# Patient Record
Sex: FEMALE | Race: WHITE | NOT HISPANIC OR LATINO | URBAN - METROPOLITAN AREA
[De-identification: names, ages, dates, MRNs, and addresses within clinical notes are randomized per-mention and may not be internally consistent; named-entity substitution may affect disease eponyms.]

---

## 2020-06-02 ENCOUNTER — OFFICE VISIT (OUTPATIENT)
Dept: URBAN - METROPOLITAN AREA CLINIC 23 | Facility: CLINIC | Age: 85
End: 2020-06-02
Payer: MEDICARE

## 2020-06-02 DIAGNOSIS — H35.3111 NONEXUDATIVE AGE-RELATED MACULAR DEGENERATION OF RIGHT EYE, EARLY DRY STAGE: ICD-10-CM

## 2020-06-02 DIAGNOSIS — G51.0 BELL'S PALSY: Primary | ICD-10-CM

## 2020-06-02 DIAGNOSIS — H25.813 COMBINED FORMS OF AGE-RELATED CATARACT, BILATERAL: ICD-10-CM

## 2020-06-02 PROCEDURE — 92004 COMPRE OPH EXAM NEW PT 1/>: CPT | Performed by: OPTOMETRIST

## 2020-06-02 ASSESSMENT — KERATOMETRY
OS: 48.75
OD: 48.13

## 2020-06-02 ASSESSMENT — INTRAOCULAR PRESSURE
OS: 16
OD: 16

## 2020-06-02 NOTE — IMPRESSION/PLAN
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 Plan: Discussed findings. Recommend take children's multivitamin to see if she can tolerate it. Pt states she gets upset stomach with vitamins and green vegetables. Monitor.

## 2020-06-02 NOTE — IMPRESSION/PLAN
Impression: Bell's palsy: G51.0 OS. Plan: Discussed findings. No staining seen OS. Continue AT during the day and luc at bedtime.

## 2023-03-02 ENCOUNTER — OFFICE VISIT (OUTPATIENT)
Dept: URBAN - METROPOLITAN AREA CLINIC 23 | Facility: CLINIC | Age: 88
End: 2023-03-02
Payer: MEDICARE

## 2023-03-02 DIAGNOSIS — H35.3111 NONEXUDATIVE AGE-RELATED MACULAR DEGENERATION, RIGHT EYE, EARLY DRY STAGE: ICD-10-CM

## 2023-03-02 DIAGNOSIS — H25.813 COMBINED FORMS OF AGE-RELATED CATARACT, BILATERAL: Primary | ICD-10-CM

## 2023-03-02 DIAGNOSIS — H53.2 DIPLOPIA: ICD-10-CM

## 2023-03-02 PROCEDURE — 92134 CPTRZ OPH DX IMG PST SGM RTA: CPT | Performed by: OPTOMETRIST

## 2023-03-02 PROCEDURE — 99214 OFFICE O/P EST MOD 30 MIN: CPT | Performed by: OPTOMETRIST

## 2023-03-02 ASSESSMENT — INTRAOCULAR PRESSURE
OD: 14
OS: 14

## 2023-03-02 ASSESSMENT — KERATOMETRY
OD: 48.25
OS: 48.63

## 2023-03-02 ASSESSMENT — VISUAL ACUITY
OD: 20/50
OS: 20/60

## 2023-03-02 NOTE — IMPRESSION/PLAN
Impression: Nonexudative age-related macular degeneration, right eye, early dry stage: H35.3111. Right. Condition: established, stable. Plan: Stable drusen. Monitor, call with any new distortions.

## 2023-03-02 NOTE — IMPRESSION/PLAN
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 Condition: will continue to monitor. Plan: Double vision in phoropter during manifest refraction. May need possible prism in rx.

## 2023-03-02 NOTE — IMPRESSION/PLAN
Impression: Combined forms of age-related cataract, bilateral: H25.813. Bilateral. Condition: moderate. Plan: Cataracts account for the patient's complaints. Discussed that glasses will provide minimal improvement to [address] [facility] Will need glasses post-operatively due to high astigmatism. Patient will think about cataract surgery, has concerns due to ongoing dialysis Monday/Wednesday/Friday along with other upcoming surgeries. Will call back to schedule consult PRN.

## 2023-07-13 NOTE — IMPRESSION/PLAN
Impression: Combined forms of age-related cataract, bilateral: H25.813. Plan: Cataracts account for the patient's complaints. Small. No treatment currently recommended. The patient will monitor vision changes and contact us with any decrease in vision. SSKI Counseling:  I discussed with the patient the risks of SSKI including but not limited to thyroid abnormalities, metallic taste, GI upset, fever, headache, acne, arthralgias, paraesthesias, lymphadenopathy, easy bleeding, arrhythmias, and allergic reaction.